# Patient Record
Sex: MALE
[De-identification: names, ages, dates, MRNs, and addresses within clinical notes are randomized per-mention and may not be internally consistent; named-entity substitution may affect disease eponyms.]

---

## 2020-04-05 ENCOUNTER — HOSPITAL ENCOUNTER (EMERGENCY)
Dept: HOSPITAL 25 - UCEAST | Age: 40
Discharge: HOME | End: 2020-04-05
Payer: COMMERCIAL

## 2020-04-05 DIAGNOSIS — S93.402A: Primary | ICD-10-CM
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
## 2020-04-05 DIAGNOSIS — Z88.1: ICD-10-CM
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
## 2020-04-05 DIAGNOSIS — F17.210: ICD-10-CM

## 2020-04-05 PROCEDURE — G0463 HOSPITAL OUTPT CLINIC VISIT: HCPCS

## 2020-04-05 PROCEDURE — 99202 OFFICE O/P NEW SF 15 MIN: CPT

## 2020-04-05 NOTE — UC
Lower Extremity/Ankle HPI





- HPI Summary


HPI Summary: 


39-year-old male comes in with a chief complaint of left ankle pain.  Pain 

started 3 days ago after playing soccer with his son.  Pain is worse in the 

lateral aspect of the left ankle.  There is some pain on the dorsal aspect of 

the ankle also.  Pain is worse with weightbearing and palpation.  Tried some 

ibuprofen which did help decrease the pain.





- History of Current Complaint


Chief Complaint: UCLowerExtremity


Stated Complaint: FOOT COMPLAINT


Time Seen by Provider: 20 10:38


Pain Intensity: 6





- Allergies/Home Medications


Allergies/Adverse Reactions: 


 Allergies











Allergy/AdvReac Type Severity Reaction Status Date / Time


 


erythromycin base Allergy  Anaphylatic Verified 20 10:31





   Shock  











Home Medications: 


 Home Medications





NK [No Home Medications Reported]  20 [History Confirmed 20]











PMH/Surg Hx/FS Hx/Imm Hx


Previously Healthy: Yes





- Surgical History


Surgical History: Yes


Surgery Procedure, Year, and Place: spinal fusion.  removal of hardware from 

spinal fusion





- Family History


Known Family History: Positive: Non-Contributory





- Social History


Alcohol Use: Occasionally


Substance Use Type: None


Smoking Status (MU): Current Every Day Smoker


Type: Cigarettes


Amount Used/How Often: 1/2 ppd





Review of Systems


All Other Systems Reviewed And Are Negative: Yes


Constitutional: Positive: Negative


Skin: Positive: Negative


Eyes: Positive: Negative


ENT: Positive: Negative


Respiratory: Positive: Negative


Cardiovascular: Positive: Negative


Motor: Positive: Negative


Neurovascular: Positive: Negative


Musculoskeletal: Positive: Other: - see hpi


Neurological/Mental Status: Positive: Negative


Psychological: Positive: Negative


Is Patient Immunocompromised?: No





Physical Exam


Triage Information Reviewed: Yes


Appearance: Well-Appearing, No Pain Distress, Well-Nourished


Vital Signs: 


 Initial Vital Signs











Temp  97.6 F   20 10:37


 


Pulse  82   20 10:37


 


Resp  16   20 10:37


 


BP  149/94   20 10:37


 


Pulse Ox  97   20 10:37











Vital Signs Reviewed: Yes


Eye Exam: Normal


Eyes: Positive: Conjunctiva Clear


Neck: Positive: Supple


Respiratory: Positive: No respiratory distress


Musculoskeletal: Positive: Other: - Left ankle is tender to palpation on the 

lateral malleolus.  There is some soft tissue swelling.  Achilles tendon is 

intact and mildly tender to palpation.  Medial malleolus is nontender.  

Anterior ankle is nontender to palpation.  Toes and ankle do have full range of 

motion with normal capillary refill normal sensation.


Neurological: Positive: Alert


Psychological: Positive: Age Appropriate Behavior


Skin Exam: Normal





Lower Extremity Course/Dx





- Course


Course Of Treatment: 





: Ion Donahue ([id number])


: SHAR ([facility])


Report Date: 2020 11:19:00


Report Status: Final


============================== Start of Report Content =========================

=====





Patient Name: nevin chavez Medical Record#: [id number] Ordering Physician: 

Hamzah Espinosa MD Acct.#: [id number] : 1980 Age: 39 Sex: M Location

: [facility] Exam Date: 20 1043 ADM Status: REG ER Order 

Information: ANKLE LEFT 3+VWS Accession Number: [id number] CPT: 36950 

INDICATION: Lateral left ankle pain since soccer injury 3 days earlier 

COMPARISON: None. TECHNIQUE: 3 views of the left ankle were obtained. FINDINGS: 

The well corticated bones exhibit normal alignment. Joint spaces appear 

maintained. No fracture is seen. IMPRESSION: Normal ankle radiograph. If the 

patient's symptoms persist, follow-up imaging is recommended. __________________

__________________________________________  20 1115 Dictated By: Ion Donahue MD Dictated Date/Time: 20 111 Transcribed Date/Time: 20 

111 Copy to: CC:No Primary Care Phys,NOPCP ; Hamzah Espinosa MD Imaging - 

[facility] Imaging - [facility] Urgent [facility] [place] [address] [place] [address] ph ([phone]) ph ([phone])  ([phone]) 





============================== End of Report Content ===========================

===





I discussed the x-rays with the patient's.  No fracture seen.  Ace wrap and gel 

ankle splint placed by nursing patient neurovascular intact after placement.  

Patient declined crutches.  I recommended weightbearing as tolerated, rest, 

apply ice and take ibuprofen.  Follow-up with sports medicine or orthopedics if 

not completely improved.





- Differential Dx/Diagnosis


Provider Diagnosis: 


 Sprain of left ankle








Discharge ED





- Sign-Out/Discharge


Documenting (check all that apply): Patient Departure


All imaging exams completed and their final reports reviewed: Yes





- Discharge Plan


Condition: Stable


Disposition: HOME


Patient Education Materials:  Ankle Sprain (ED)


Referrals: 


Naomy Robertson MD [Medical Doctor] - 


[facility] [Provider Group]


Additional Instructions: 


FOLLOW UP WITH SPORTS MEDICINE OR ORTHOPEDICS IF NOT COMPLETELY IMPROVED.


GET REEVALUATED IF NOT IMPROVED OR WORSE OR ANY QUESTIONS OR CONCERNS.





- Billing Disposition and Condition


Condition: STABLE


Disposition: Home